# Patient Record
Sex: MALE | Race: WHITE | NOT HISPANIC OR LATINO | URBAN - METROPOLITAN AREA
[De-identification: names, ages, dates, MRNs, and addresses within clinical notes are randomized per-mention and may not be internally consistent; named-entity substitution may affect disease eponyms.]

---

## 2023-03-31 DIAGNOSIS — E11.9 TYPE 2 DIABETES MELLITUS WITHOUT COMPLICATIONS (MULTI): ICD-10-CM

## 2023-03-31 RX ORDER — METFORMIN HYDROCHLORIDE 500 MG/1
1000 TABLET, EXTENDED RELEASE ORAL 2 TIMES DAILY
Qty: 120 TABLET | Refills: 0 | Status: SHIPPED | OUTPATIENT
Start: 2023-03-31 | End: 2023-12-18 | Stop reason: SDUPTHER

## 2023-04-17 DIAGNOSIS — I10 ESSENTIAL (PRIMARY) HYPERTENSION: ICD-10-CM

## 2023-04-17 RX ORDER — LISINOPRIL 20 MG/1
TABLET ORAL
Qty: 90 TABLET | Refills: 1 | OUTPATIENT
Start: 2023-04-17

## 2023-05-02 DIAGNOSIS — E11.9 TYPE 2 DIABETES MELLITUS WITHOUT COMPLICATIONS (MULTI): ICD-10-CM

## 2023-05-02 RX ORDER — METFORMIN HYDROCHLORIDE 500 MG/1
TABLET, EXTENDED RELEASE ORAL
Qty: 120 TABLET | Refills: 0 | OUTPATIENT
Start: 2023-05-02

## 2023-06-08 ENCOUNTER — OFFICE VISIT (OUTPATIENT)
Dept: PRIMARY CARE | Facility: CLINIC | Age: 33
End: 2023-06-08
Payer: COMMERCIAL

## 2023-06-08 VITALS
SYSTOLIC BLOOD PRESSURE: 138 MMHG | HEART RATE: 66 BPM | DIASTOLIC BLOOD PRESSURE: 90 MMHG | WEIGHT: 229 LBS | HEIGHT: 70 IN | BODY MASS INDEX: 32.78 KG/M2

## 2023-06-08 DIAGNOSIS — E79.0 HYPERURICEMIA: ICD-10-CM

## 2023-06-08 DIAGNOSIS — E11.9 TYPE 2 DIABETES MELLITUS WITHOUT COMPLICATION, WITHOUT LONG-TERM CURRENT USE OF INSULIN (MULTI): Primary | ICD-10-CM

## 2023-06-08 DIAGNOSIS — E66.9 CLASS 1 OBESITY WITHOUT SERIOUS COMORBIDITY WITH BODY MASS INDEX (BMI) OF 32.0 TO 32.9 IN ADULT, UNSPECIFIED OBESITY TYPE: ICD-10-CM

## 2023-06-08 DIAGNOSIS — I10 BENIGN ESSENTIAL HYPERTENSION: ICD-10-CM

## 2023-06-08 PROBLEM — F33.0 MILD EPISODE OF RECURRENT MAJOR DEPRESSIVE DISORDER (CMS-HCC): Status: ACTIVE | Noted: 2023-06-08

## 2023-06-08 PROBLEM — E66.811 CLASS 1 OBESITY WITHOUT SERIOUS COMORBIDITY WITH BODY MASS INDEX (BMI) OF 32.0 TO 32.9 IN ADULT: Status: ACTIVE | Noted: 2023-06-08

## 2023-06-08 PROBLEM — E88.810 METABOLIC SYNDROME: Status: ACTIVE | Noted: 2023-06-08

## 2023-06-08 PROCEDURE — 4010F ACE/ARB THERAPY RXD/TAKEN: CPT | Performed by: STUDENT IN AN ORGANIZED HEALTH CARE EDUCATION/TRAINING PROGRAM

## 2023-06-08 PROCEDURE — 3075F SYST BP GE 130 - 139MM HG: CPT | Performed by: STUDENT IN AN ORGANIZED HEALTH CARE EDUCATION/TRAINING PROGRAM

## 2023-06-08 PROCEDURE — 99214 OFFICE O/P EST MOD 30 MIN: CPT | Performed by: STUDENT IN AN ORGANIZED HEALTH CARE EDUCATION/TRAINING PROGRAM

## 2023-06-08 PROCEDURE — 1036F TOBACCO NON-USER: CPT | Performed by: STUDENT IN AN ORGANIZED HEALTH CARE EDUCATION/TRAINING PROGRAM

## 2023-06-08 PROCEDURE — 3008F BODY MASS INDEX DOCD: CPT | Performed by: STUDENT IN AN ORGANIZED HEALTH CARE EDUCATION/TRAINING PROGRAM

## 2023-06-08 PROCEDURE — 3080F DIAST BP >= 90 MM HG: CPT | Performed by: STUDENT IN AN ORGANIZED HEALTH CARE EDUCATION/TRAINING PROGRAM

## 2023-06-08 RX ORDER — LISINOPRIL 20 MG/1
20 TABLET ORAL DAILY
COMMUNITY
End: 2023-06-08

## 2023-06-08 RX ORDER — LISINOPRIL 40 MG/1
40 TABLET ORAL DAILY
Qty: 90 TABLET | Refills: 1 | Status: SHIPPED | OUTPATIENT
Start: 2023-06-08 | End: 2023-12-05

## 2023-06-08 ASSESSMENT — PATIENT HEALTH QUESTIONNAIRE - PHQ9
2. FEELING DOWN, DEPRESSED OR HOPELESS: NOT AT ALL
1. LITTLE INTEREST OR PLEASURE IN DOING THINGS: NOT AT ALL
SUM OF ALL RESPONSES TO PHQ9 QUESTIONS 1 AND 2: 0

## 2023-06-08 NOTE — PROGRESS NOTES
"Kyree Moore is a 33 y.o. year old male presenting for Med Refill       HPI:  Hypertension  Patient is currently on lisinopril 20 mg daily and his blood pressure has been well controlled on this dose in and outside of the office.  His blood pressure is high today in the office.  He can tell a difference if he does double up on his lisinopril, he actually feels better when he takes 2.  Therefore, he is okay with going up on his medication today.  He denies any chest pain, shortness of breath, headaches, weakness, numbness, tingling, lower extremity edema.    Diabetes  Unfortunately, he was oximetry yesterday further unable to tolerate metformin 2000 mg daily, so he is currently on 1000 mg daily.  He is technically overdue for an A1c and he is is willing to go in the next couple of weeks to get that done.  He has not been on any other medication for diabetes but he does know that he would not want to do an injectable.  He has been implementing lifestyle changes through intermittent fasting and watching what he eats.  He already is a pretty active anabela, he works as a labor intensive job.  He is down some weight and he plans on continuing to lose weight.  ROS:   All pertinent positive symptoms are included in history of present illness.    All other systems have been reviewed and are negative and noncontributory to this patient's current ailments.    Current Outpatient Medications   Medication Sig Dispense Refill    lisinopril 20 mg tablet Take 1 tablet (20 mg) by mouth once daily.      metFORMIN XR (Glucophage-XR) 500 mg 24 hr tablet Take 2 tablets (1,000 mg) by mouth in the morning and 2 tablets (1,000 mg) before bedtime. 120 tablet 0     No current facility-administered medications for this visit.       OBJECTIVE  Visit Vitals  /90   Pulse 66   Ht 1.778 m (5' 10\")   Wt 104 kg (229 lb)   BMI 32.86 kg/m²   Smoking Status Never   BSA 2.27 m²        Physical Exam:  GENERAL: Alert, oriented, pleasant, in no acute " distress  HEENT: Head normocephalic, atraumatic  CV: Heart with regular rate and rhythm, normal S1/S2, no murmurs; normal peripheral pulses- radial and dorsalis pedis palpable  LUNGS: CTAB without wheezing, rhonchi or rales; good respiratory effort, no increased work of breathing  ABDOMEN: soft, non-tender, non-distended, no masses appreciated; +BS in all four quadrants  EXTREMITIES: no edema, no cyanosis  PSYCH: Appropriate mood and affect  SKIN: No rashes or lesions appreciated    Assessment/Plan   Diagnoses and all orders for this visit:  Type 2 diabetes mellitus without complication, without long-term current use of insulin (CMS/[facility])  Continue metformin 1000 mg daily.  He is not tolerating higher doses of the metformin secondary to diarrhea.  I do not want to send another medication until I have an updated A1c, so labs were ordered today.  He will go in the next couple of weeks to get this done  -     Albumin , Urine Random; Future  -     Comprehensive Metabolic Panel; Future  -     Hemoglobin A1C; Future  -     Lipid Panel; Future  Benign essential hypertension  Currently uncontrolled on lisinopril 20 mg.  Increase lisinopril to 40 mg daily.  I asked that he check his blood pressure daily and keep a log and send this log to me to make sure his blood pressure is now well controlled.  -     Lipid Panel; Future  Hyperuricemia  He has not had any further gout flares, but will check uric acid level to see where he is at now  -     Uric acid; Future  Class 1 obesity without serious comorbidity with body mass index (BMI) of 32.0 to 32.9 in adult, unspecified obesity type  Continue the lifestyle changes implemented as that they have been effective at helping with weight loss    Based on his A1c, he will follow-up in 3 to 6 months

## 2023-06-23 ENCOUNTER — LAB (OUTPATIENT)
Dept: LAB | Facility: LAB | Age: 33
End: 2023-06-23
Payer: COMMERCIAL

## 2023-06-23 DIAGNOSIS — E11.9 TYPE 2 DIABETES MELLITUS WITHOUT COMPLICATION, WITHOUT LONG-TERM CURRENT USE OF INSULIN (MULTI): ICD-10-CM

## 2023-06-23 DIAGNOSIS — I10 BENIGN ESSENTIAL HYPERTENSION: ICD-10-CM

## 2023-06-23 DIAGNOSIS — E79.0 HYPERURICEMIA: ICD-10-CM

## 2023-06-23 LAB
ALANINE AMINOTRANSFERASE (SGPT) (U/L) IN SER/PLAS: 19 U/L (ref 10–52)
ALBUMIN (G/DL) IN SER/PLAS: 4.4 G/DL (ref 3.4–5)
ALBUMIN (MG/L) IN URINE: <7 MG/L
ALBUMIN/CREATININE (UG/MG) IN URINE: NORMAL UG/MG CRT (ref 0–30)
ALKALINE PHOSPHATASE (U/L) IN SER/PLAS: 61 U/L (ref 33–120)
ANION GAP IN SER/PLAS: 11 MMOL/L (ref 10–20)
ASPARTATE AMINOTRANSFERASE (SGOT) (U/L) IN SER/PLAS: 14 U/L (ref 9–39)
BILIRUBIN TOTAL (MG/DL) IN SER/PLAS: 0.7 MG/DL (ref 0–1.2)
CALCIUM (MG/DL) IN SER/PLAS: 9.2 MG/DL (ref 8.6–10.6)
CARBON DIOXIDE, TOTAL (MMOL/L) IN SER/PLAS: 29 MMOL/L (ref 21–32)
CHLORIDE (MMOL/L) IN SER/PLAS: 101 MMOL/L (ref 98–107)
CHOLESTEROL (MG/DL) IN SER/PLAS: 166 MG/DL (ref 0–199)
CHOLESTEROL IN HDL (MG/DL) IN SER/PLAS: 31.6 MG/DL
CHOLESTEROL/HDL RATIO: 5.3
CREATININE (MG/DL) IN SER/PLAS: 0.94 MG/DL (ref 0.5–1.3)
CREATININE (MG/DL) IN URINE: 61.5 MG/DL (ref 20–370)
ESTIMATED AVERAGE GLUCOSE FOR HBA1C: 137 MG/DL
GFR MALE: >90 ML/MIN/1.73M2
GLUCOSE (MG/DL) IN SER/PLAS: 113 MG/DL (ref 74–99)
HEMOGLOBIN A1C/HEMOGLOBIN TOTAL IN BLOOD: 6.4 %
LDL: 102 MG/DL (ref 0–99)
POTASSIUM (MMOL/L) IN SER/PLAS: 4.9 MMOL/L (ref 3.5–5.3)
PROTEIN TOTAL: 7.3 G/DL (ref 6.4–8.2)
SODIUM (MMOL/L) IN SER/PLAS: 136 MMOL/L (ref 136–145)
TRIGLYCERIDE (MG/DL) IN SER/PLAS: 161 MG/DL (ref 0–149)
URATE (MG/DL) IN SER/PLAS: 8.9 MG/DL (ref 4–7.5)
UREA NITROGEN (MG/DL) IN SER/PLAS: 14 MG/DL (ref 6–23)
VLDL: 32 MG/DL (ref 0–40)

## 2023-06-23 PROCEDURE — 36415 COLL VENOUS BLD VENIPUNCTURE: CPT

## 2023-06-23 PROCEDURE — 80053 COMPREHEN METABOLIC PANEL: CPT

## 2023-06-23 PROCEDURE — 80061 LIPID PANEL: CPT

## 2023-06-23 PROCEDURE — 84550 ASSAY OF BLOOD/URIC ACID: CPT

## 2023-06-23 PROCEDURE — 82043 UR ALBUMIN QUANTITATIVE: CPT

## 2023-06-23 PROCEDURE — 82570 ASSAY OF URINE CREATININE: CPT

## 2023-06-23 PROCEDURE — 83036 HEMOGLOBIN GLYCOSYLATED A1C: CPT

## 2023-06-26 DIAGNOSIS — E11.9 TYPE 2 DIABETES MELLITUS WITHOUT COMPLICATION, WITHOUT LONG-TERM CURRENT USE OF INSULIN (MULTI): Primary | ICD-10-CM

## 2023-10-03 ENCOUNTER — OFFICE VISIT (OUTPATIENT)
Dept: PRIMARY CARE | Facility: CLINIC | Age: 33
End: 2023-10-03
Payer: COMMERCIAL

## 2023-10-03 VITALS
WEIGHT: 236 LBS | DIASTOLIC BLOOD PRESSURE: 84 MMHG | SYSTOLIC BLOOD PRESSURE: 126 MMHG | HEART RATE: 116 BPM | BODY MASS INDEX: 33.79 KG/M2 | HEIGHT: 70 IN

## 2023-10-03 DIAGNOSIS — R11.0 NAUSEA: ICD-10-CM

## 2023-10-03 DIAGNOSIS — K43.9 HERNIA OF ANTERIOR ABDOMINAL WALL: Primary | ICD-10-CM

## 2023-10-03 DIAGNOSIS — K21.9 GASTROESOPHAGEAL REFLUX DISEASE, UNSPECIFIED WHETHER ESOPHAGITIS PRESENT: ICD-10-CM

## 2023-10-03 DIAGNOSIS — E11.9 TYPE 2 DIABETES MELLITUS WITHOUT COMPLICATION, WITHOUT LONG-TERM CURRENT USE OF INSULIN (MULTI): ICD-10-CM

## 2023-10-03 PROCEDURE — 3044F HG A1C LEVEL LT 7.0%: CPT | Performed by: STUDENT IN AN ORGANIZED HEALTH CARE EDUCATION/TRAINING PROGRAM

## 2023-10-03 PROCEDURE — 99214 OFFICE O/P EST MOD 30 MIN: CPT | Performed by: STUDENT IN AN ORGANIZED HEALTH CARE EDUCATION/TRAINING PROGRAM

## 2023-10-03 PROCEDURE — 3008F BODY MASS INDEX DOCD: CPT | Performed by: STUDENT IN AN ORGANIZED HEALTH CARE EDUCATION/TRAINING PROGRAM

## 2023-10-03 PROCEDURE — 4010F ACE/ARB THERAPY RXD/TAKEN: CPT | Performed by: STUDENT IN AN ORGANIZED HEALTH CARE EDUCATION/TRAINING PROGRAM

## 2023-10-03 PROCEDURE — 3074F SYST BP LT 130 MM HG: CPT | Performed by: STUDENT IN AN ORGANIZED HEALTH CARE EDUCATION/TRAINING PROGRAM

## 2023-10-03 PROCEDURE — 1036F TOBACCO NON-USER: CPT | Performed by: STUDENT IN AN ORGANIZED HEALTH CARE EDUCATION/TRAINING PROGRAM

## 2023-10-03 PROCEDURE — 3079F DIAST BP 80-89 MM HG: CPT | Performed by: STUDENT IN AN ORGANIZED HEALTH CARE EDUCATION/TRAINING PROGRAM

## 2023-10-03 RX ORDER — ONDANSETRON 4 MG/1
4 TABLET, FILM COATED ORAL EVERY 8 HOURS PRN
Qty: 20 TABLET | Refills: 0 | Status: SHIPPED | OUTPATIENT
Start: 2023-10-03 | End: 2023-10-10

## 2023-10-03 RX ORDER — OMEPRAZOLE 20 MG/1
20 CAPSULE, DELAYED RELEASE ORAL DAILY
Qty: 30 CAPSULE | Refills: 0 | Status: SHIPPED | OUTPATIENT
Start: 2023-10-03 | End: 2023-10-25

## 2023-10-03 NOTE — PROGRESS NOTES
Subjective   Patient ID: Kyree Moore is a 33 y.o. male who presents for Hernia.    HPI  1.  Hernia  Reports lifting heavy pans nearly 80 pounds each about a month ago and recalls having slight abdominal discomfort at the time which was brief in duration  He is concerned because he has noticed a small protrusion of his abdomen to the right of his umbilicus ever since that time  States that this has not been painful ever or changed in size but is seeking evaluation today to see if this may be a hernia    2.  Nausea/GERD  Reports history of nausea which began just over this past weekend  Denies excessive consumption of food or alcohol  Denies fever, abdominal pain or vomiting  Admits to history of GERD in the past for which he was taking omeprazole but is not currently  Denies current acid reflux symptoms    3.  T2DM  This was addressed during his visit with us in June and is stable    Review of Systems  All pertinent positive symptoms are included in the history of present illness.    All other systems have been reviewed and are negative and noncontributory to this patient's current ailments.    Current Outpatient Medications   Medication Instructions    lisinopril 40 mg, oral, Daily    metFORMIN XR (GLUCOPHAGE-XR) 1,000 mg, oral, 2 times daily    omeprazole (PRILOSEC) 20 mg, oral, Daily, Do not crush or chew.    ondansetron (ZOFRAN) 4 mg, oral, Every 8 hours PRN     No Known Allergies      There is no immunization history on file for this patient.  Past Surgical History:   Procedure Laterality Date    OTHER SURGICAL HISTORY  10/08/2020    No history of surgery    WISDOM TOOTH EXTRACTION       Family History   Problem Relation Name Age of Onset    Aortic aneurysm Mother      Hypertension Mother      Other (bladder cancer) Father      Kidney failure Father      Hypertension Father      Prostate cancer Paternal Grandfather       Social History     Tobacco Use    Smoking status: Never    Smokeless tobacco: Never  "      Objective   Visit Vitals  /84   Pulse (!) 116   Ht 1.778 m (5' 10\")   Wt 107 kg (236 lb)   BMI 33.86 kg/m²   Smoking Status Never   BSA 2.3 m²       Physical Exam  CONSTITUTIONAL - well nourished, well developed, looks like stated age, in no acute distress, not ill-appearing, and not tired appearing  SKIN - normal skin color and pigmentation, normal skin turgor without rash, lesions, or nodules visualized  HEAD - no trauma, normocephalic  CHEST - clear to auscultation, no wheezing, no crackles and no rales, good effort  CARDIAC - regular rate and regular rhythm, no skipped beats, no murmur  ABDOMEN - soft, nontender, nondistended, no palpable masses or hernias appreciated even during leg lift  EXTREMITIES - no edema, no deformities  NEUROLOGICAL - normal gait, normal balance, normal motor  PSYCHIATRIC - alert, pleasant and cordial, age-appropriate    Assessment/Plan   Problem List Items Addressed This Visit          Endocrine/Metabolic    Type 2 diabetes mellitus (CMS/HCC)     Stable, no changes            Gastrointestinal and Abdominal    Nausea     I suspect your nausea may be from acid reflux  As previously mentioned, we will treat with omeprazole for at least 2 weeks  Should your nausea persist, I have also sent a prescription for Zofran to take as needed         Relevant Medications    ondansetron (Zofran) 4 mg tablet    Gastroesophageal reflux disease    Relevant Medications    omeprazole (PriLOSEC) 20 mg DR capsule    Hernia of anterior abdominal wall - Primary     There was no evidence of an abdominal hernia on your exam today  You were also unable to find this protuberance mentioned when I asked you to  It is possible you may have developed a small hernia while lifting the heavy pans last month but there is no definitive evidence based on my findings  I explained the importance of continued surveillance of any new or worsening abdominal pain as this could be a sign of bowel strangulation which " is a surgical emergency  You expressed full understanding of this and agreed to proceed to the nearest emergency department should these symptoms occur          Current Outpatient Medications   Medication Instructions    lisinopril 40 mg, oral, Daily    metFORMIN XR (GLUCOPHAGE-XR) 1,000 mg, oral, 2 times daily    omeprazole (PRILOSEC) 20 mg, oral, Daily, Do not crush or chew.    ondansetron (ZOFRAN) 4 mg, oral, Every 8 hours PRN

## 2023-10-04 NOTE — ASSESSMENT & PLAN NOTE
I suspect your nausea may be from acid reflux  As previously mentioned, we will treat with omeprazole for at least 2 weeks  Should your nausea persist, I have also sent a prescription for Zofran to take as needed

## 2023-10-04 NOTE — ASSESSMENT & PLAN NOTE
There was no evidence of an abdominal hernia on your exam today  You were also unable to find this protuberance mentioned when I asked you to  It is possible you may have developed a small hernia while lifting the heavy pans last month but there is no definitive evidence based on my findings  I explained the importance of continued surveillance of any new or worsening abdominal pain as this could be a sign of bowel strangulation which is a surgical emergency  You expressed full understanding of this and agreed to proceed to the nearest emergency department should these symptoms occur

## 2023-10-16 ENCOUNTER — OFFICE VISIT (OUTPATIENT)
Dept: PRIMARY CARE | Facility: CLINIC | Age: 33
End: 2023-10-16
Payer: COMMERCIAL

## 2023-10-16 VITALS
WEIGHT: 238 LBS | BODY MASS INDEX: 34.07 KG/M2 | DIASTOLIC BLOOD PRESSURE: 82 MMHG | HEART RATE: 124 BPM | SYSTOLIC BLOOD PRESSURE: 122 MMHG | HEIGHT: 70 IN

## 2023-10-16 DIAGNOSIS — E79.0 HYPERURICEMIA: ICD-10-CM

## 2023-10-16 DIAGNOSIS — M10.072 ACUTE IDIOPATHIC GOUT INVOLVING TOE OF LEFT FOOT: Primary | ICD-10-CM

## 2023-10-16 DIAGNOSIS — R22.2 ABDOMINAL WALL LUMP: ICD-10-CM

## 2023-10-16 PROCEDURE — 99214 OFFICE O/P EST MOD 30 MIN: CPT | Performed by: STUDENT IN AN ORGANIZED HEALTH CARE EDUCATION/TRAINING PROGRAM

## 2023-10-16 PROCEDURE — 1036F TOBACCO NON-USER: CPT | Performed by: STUDENT IN AN ORGANIZED HEALTH CARE EDUCATION/TRAINING PROGRAM

## 2023-10-16 PROCEDURE — 3079F DIAST BP 80-89 MM HG: CPT | Performed by: STUDENT IN AN ORGANIZED HEALTH CARE EDUCATION/TRAINING PROGRAM

## 2023-10-16 PROCEDURE — 4010F ACE/ARB THERAPY RXD/TAKEN: CPT | Performed by: STUDENT IN AN ORGANIZED HEALTH CARE EDUCATION/TRAINING PROGRAM

## 2023-10-16 PROCEDURE — 3044F HG A1C LEVEL LT 7.0%: CPT | Performed by: STUDENT IN AN ORGANIZED HEALTH CARE EDUCATION/TRAINING PROGRAM

## 2023-10-16 PROCEDURE — 3008F BODY MASS INDEX DOCD: CPT | Performed by: STUDENT IN AN ORGANIZED HEALTH CARE EDUCATION/TRAINING PROGRAM

## 2023-10-16 PROCEDURE — 3074F SYST BP LT 130 MM HG: CPT | Performed by: STUDENT IN AN ORGANIZED HEALTH CARE EDUCATION/TRAINING PROGRAM

## 2023-10-16 RX ORDER — COLCHICINE 0.6 MG/1
0.6 TABLET ORAL DAILY
Qty: 90 TABLET | Refills: 0 | Status: SHIPPED | OUTPATIENT
Start: 2023-10-16 | End: 2024-01-14

## 2023-10-16 RX ORDER — ALLOPURINOL 100 MG/1
100 TABLET ORAL DAILY
Qty: 30 TABLET | Refills: 1 | Status: SHIPPED | OUTPATIENT
Start: 2023-10-16 | End: 2023-11-07

## 2023-10-16 RX ORDER — PREDNISONE 20 MG/1
60 TABLET ORAL DAILY
Qty: 15 TABLET | Refills: 0 | Status: SHIPPED | OUTPATIENT
Start: 2023-10-16 | End: 2023-10-21

## 2023-10-16 NOTE — PROGRESS NOTES
"Kyree Moore is a 33 y.o. year old male presenting for Gout       HPI:  Gout  Patient woke up yesterday with extreme left big toe pain actually on the bottom of the toe and the inability to walk on that side of his foot due to pretty severe pain.  He does have a history of gout, but he has always had it in his ankles not in his toes.  He has had hyperuricemia on his blood work, but has not been on any uric acid lowering medication to this point.  He did have some leftover colchicine at home, so he actually started taking it and it has not been helpful.  He has also been taking 600 mg of ibuprofen a few times and that has not been helpful either.  He cannot recall any trauma to the toe/foot, so he does believe that this is another gout flare.    Abdominal wall lump  He states that this lump to the right of his bellybutton is still present and its really concerning to him at this point that it is still there.  It is not painful or bothering him, but it is just bothering him that there is a lump/bump there when there should not be.  He is wondering what we can do at this point to investigate that to see what it is.  ROS:   All pertinent positive symptoms are included in history of present illness.    All other systems have been reviewed and are negative and noncontributory to this patient's current ailments.    Current Outpatient Medications   Medication Sig Dispense Refill    lisinopril 40 mg tablet Take 1 tablet (40 mg) by mouth once daily. 90 tablet 1    metFORMIN XR (Glucophage-XR) 500 mg 24 hr tablet Take 2 tablets (1,000 mg) by mouth in the morning and 2 tablets (1,000 mg) before bedtime. 120 tablet 0    omeprazole (PriLOSEC) 20 mg DR capsule Take 1 capsule (20 mg) by mouth once daily. Do not crush or chew. 30 capsule 0     No current facility-administered medications for this visit.       OBJECTIVE  Visit Vitals  /82   Pulse (!) 124   Ht 1.778 m (5' 10\")   Wt 108 kg (238 lb)   BMI 34.15 kg/m²   Smoking " Status Never   BSA 2.31 m²        Physical Exam:  GENERAL: Alert, oriented, pleasant, in no acute distress  HEENT: Head normocephalic, atraumatic  EXTREMITIES: no edema, no cyanosis; tenderness on the plantar side of the first MTP joint without redness  PSYCH: Appropriate mood and affect  SKIN: No rashes or lesions appreciated    Assessment/Plan   Diagnoses and all orders for this visit:  Acute idiopathic gout involving toe of left foot  -     predniSONE (Deltasone) 20 mg tablet; Take 3 tablets (60 mg) by mouth once daily for 5 days.  Hyperuricemia  -     allopurinol (Zyloprim) 100 mg tablet; Take 1 tablet (100 mg) by mouth once daily.  -     colchicine, gout, 0.6 mg tablet; Take 1 tablet (0.6 mg) by mouth once daily.  -     Uric acid; Future  I instructed him to start the colchicine after he is done with the prednisone and he can start the allopurinol right away.  He is to continue the colchicine once daily for the next 8 weeks or so after starting allopurinol.  Uric acid level to be checked in 1 month  Abdominal wall lump  -     US abdomen limited; Future    Follow-up as previously scheduled

## 2023-10-25 ENCOUNTER — ANCILLARY PROCEDURE (OUTPATIENT)
Dept: RADIOLOGY | Facility: CLINIC | Age: 33
End: 2023-10-25
Payer: COMMERCIAL

## 2023-10-25 DIAGNOSIS — R22.2 ABDOMINAL WALL LUMP: ICD-10-CM

## 2023-10-25 PROCEDURE — 76705 ECHO EXAM OF ABDOMEN: CPT | Performed by: RADIOLOGY

## 2023-10-25 PROCEDURE — 76705 ECHO EXAM OF ABDOMEN: CPT

## 2023-10-27 ENCOUNTER — APPOINTMENT (OUTPATIENT)
Dept: RADIOLOGY | Facility: CLINIC | Age: 33
End: 2023-10-27
Payer: COMMERCIAL

## 2023-11-07 DIAGNOSIS — E79.0 HYPERURICEMIA: ICD-10-CM

## 2023-11-07 RX ORDER — ALLOPURINOL 100 MG/1
100 TABLET ORAL DAILY
Qty: 90 TABLET | Refills: 1 | Status: SHIPPED | OUTPATIENT
Start: 2023-11-07 | End: 2024-05-06

## 2023-11-17 ENCOUNTER — LAB (OUTPATIENT)
Dept: LAB | Facility: LAB | Age: 33
End: 2023-11-17
Payer: COMMERCIAL

## 2023-11-17 DIAGNOSIS — E79.0 HYPERURICEMIA: ICD-10-CM

## 2023-11-17 DIAGNOSIS — E11.9 TYPE 2 DIABETES MELLITUS WITHOUT COMPLICATION, WITHOUT LONG-TERM CURRENT USE OF INSULIN (MULTI): ICD-10-CM

## 2023-11-17 LAB
EST. AVERAGE GLUCOSE BLD GHB EST-MCNC: 174 MG/DL
HBA1C MFR BLD: 7.7 %
URATE SERPL-MCNC: 6.6 MG/DL (ref 4–7.5)

## 2023-11-17 PROCEDURE — 83036 HEMOGLOBIN GLYCOSYLATED A1C: CPT

## 2023-11-17 PROCEDURE — 84550 ASSAY OF BLOOD/URIC ACID: CPT

## 2023-11-17 PROCEDURE — 36415 COLL VENOUS BLD VENIPUNCTURE: CPT

## 2023-12-05 DIAGNOSIS — I10 BENIGN ESSENTIAL HYPERTENSION: ICD-10-CM

## 2023-12-05 RX ORDER — LISINOPRIL 40 MG/1
40 TABLET ORAL DAILY
Qty: 90 TABLET | Refills: 1 | Status: SHIPPED | OUTPATIENT
Start: 2023-12-05

## 2023-12-18 DIAGNOSIS — E11.9 TYPE 2 DIABETES MELLITUS WITHOUT COMPLICATIONS (MULTI): Primary | ICD-10-CM

## 2023-12-18 RX ORDER — METFORMIN HYDROCHLORIDE 500 MG/1
1000 TABLET, EXTENDED RELEASE ORAL 2 TIMES DAILY
Qty: 120 TABLET | Refills: 0 | Status: SHIPPED | OUTPATIENT
Start: 2023-12-18 | End: 2024-01-15

## 2024-01-13 DIAGNOSIS — E11.9 TYPE 2 DIABETES MELLITUS WITHOUT COMPLICATIONS (MULTI): ICD-10-CM

## 2024-01-15 RX ORDER — METFORMIN HYDROCHLORIDE 500 MG/1
1000 TABLET, EXTENDED RELEASE ORAL 2 TIMES DAILY
Qty: 360 TABLET | Refills: 1 | Status: SHIPPED | OUTPATIENT
Start: 2024-01-15

## 2024-05-06 DIAGNOSIS — E79.0 HYPERURICEMIA: ICD-10-CM

## 2024-05-06 RX ORDER — ALLOPURINOL 100 MG/1
100 TABLET ORAL DAILY
Qty: 90 TABLET | Refills: 1 | Status: SHIPPED | OUTPATIENT
Start: 2024-05-06

## 2024-06-17 DIAGNOSIS — I10 BENIGN ESSENTIAL HYPERTENSION: ICD-10-CM

## 2024-06-18 RX ORDER — LISINOPRIL 40 MG/1
40 TABLET ORAL DAILY
Qty: 90 TABLET | Refills: 1 | OUTPATIENT
Start: 2024-06-18

## 2024-06-21 DIAGNOSIS — I10 BENIGN ESSENTIAL HYPERTENSION: Primary | ICD-10-CM

## 2024-06-21 RX ORDER — LISINOPRIL 40 MG/1
40 TABLET ORAL DAILY
Qty: 90 TABLET | Refills: 1 | Status: SHIPPED | OUTPATIENT
Start: 2024-06-21

## 2024-06-27 ENCOUNTER — APPOINTMENT (OUTPATIENT)
Dept: PRIMARY CARE | Facility: CLINIC | Age: 34
End: 2024-06-27
Payer: COMMERCIAL

## 2024-06-27 VITALS
BODY MASS INDEX: 31.35 KG/M2 | SYSTOLIC BLOOD PRESSURE: 130 MMHG | DIASTOLIC BLOOD PRESSURE: 84 MMHG | HEIGHT: 70 IN | WEIGHT: 219 LBS | HEART RATE: 64 BPM

## 2024-06-27 DIAGNOSIS — Z00.00 ENCOUNTER FOR PREVENTIVE HEALTH EXAMINATION: Primary | ICD-10-CM

## 2024-06-27 DIAGNOSIS — E79.0 HYPERURICEMIA: ICD-10-CM

## 2024-06-27 DIAGNOSIS — E11.9 TYPE 2 DIABETES MELLITUS WITHOUT COMPLICATION, WITHOUT LONG-TERM CURRENT USE OF INSULIN (MULTI): ICD-10-CM

## 2024-06-27 DIAGNOSIS — Z11.59 ENCOUNTER FOR HEPATITIS C SCREENING TEST FOR LOW RISK PATIENT: ICD-10-CM

## 2024-06-27 DIAGNOSIS — I10 BENIGN ESSENTIAL HYPERTENSION: ICD-10-CM

## 2024-06-27 PROBLEM — K21.9 GASTROESOPHAGEAL REFLUX DISEASE: Status: RESOLVED | Noted: 2023-10-03 | Resolved: 2024-06-27

## 2024-06-27 PROCEDURE — 3079F DIAST BP 80-89 MM HG: CPT | Performed by: STUDENT IN AN ORGANIZED HEALTH CARE EDUCATION/TRAINING PROGRAM

## 2024-06-27 PROCEDURE — 99395 PREV VISIT EST AGE 18-39: CPT | Performed by: STUDENT IN AN ORGANIZED HEALTH CARE EDUCATION/TRAINING PROGRAM

## 2024-06-27 PROCEDURE — 3075F SYST BP GE 130 - 139MM HG: CPT | Performed by: STUDENT IN AN ORGANIZED HEALTH CARE EDUCATION/TRAINING PROGRAM

## 2024-06-27 PROCEDURE — 4010F ACE/ARB THERAPY RXD/TAKEN: CPT | Performed by: STUDENT IN AN ORGANIZED HEALTH CARE EDUCATION/TRAINING PROGRAM

## 2024-06-27 PROCEDURE — 99214 OFFICE O/P EST MOD 30 MIN: CPT | Performed by: STUDENT IN AN ORGANIZED HEALTH CARE EDUCATION/TRAINING PROGRAM

## 2024-06-27 PROCEDURE — 1036F TOBACCO NON-USER: CPT | Performed by: STUDENT IN AN ORGANIZED HEALTH CARE EDUCATION/TRAINING PROGRAM

## 2024-06-27 RX ORDER — LISINOPRIL 40 MG/1
40 TABLET ORAL DAILY
Qty: 90 TABLET | Refills: 1 | Status: SHIPPED | OUTPATIENT
Start: 2024-06-27

## 2024-06-27 RX ORDER — ALLOPURINOL 100 MG/1
100 TABLET ORAL DAILY
Qty: 90 TABLET | Refills: 1 | Status: SHIPPED | OUTPATIENT
Start: 2024-06-27

## 2024-06-27 NOTE — PROGRESS NOTES
Subjective   Patient ID: Kyree Moore is a 34 y.o. male who presents for Med Refill.    HPI  Diet is well balanced.  Exercises regularly.  Vaccines are up to date.  Dental exam is up to date.  Vision exam is not up to date.  Patient is not fasting for labs today.   Social: Tobacco use- nonsmoker      Alcohol use- very rare    Other concerns addressed today include:   Type 2 diabetes  He is currently on metformin XR 1000 mg twice daily and doing well on this medication without side effects.  He has recently had about 20 pound intentional weight loss through lifestyle changes and has been feeling really good.  He has been changing the types of sugars he is taking and by finding alternatives to the sour candies that he is pretty addicted to.  He has not had any high or low glucose symptoms.  He has not had an eye exam this year and he is agreeable to going getting that done.    Hypertension  He currently takes lisinopril 40 mg daily for blood pressure control.  Has been doing well on this medication without side effects.  Denies any chest pain, shortness of breath, headaches, weakness, numbness, tingling, lower extremity edema.    Hyperuricemia  He takes allopurinol 100 mg daily for elevated uric acid.  He has not had any issues with kidney stones or gout since his last visit and feels like it must be working at this point.  He is drinking plenty of water throughout the day.    Review of Systems  All pertinent positive symptoms are included in the history of present illness.    All other systems have been reviewed and are negative and noncontributory to this patient's current ailments.     Social History     Tobacco Use    Smoking status: Never    Smokeless tobacco: Never   Substance Use Topics    Alcohol use: Not on file      Past Surgical History:   Procedure Laterality Date    OTHER SURGICAL HISTORY  10/08/2020    No history of surgery    WISDOM TOOTH EXTRACTION        No Known Allergies     Current Outpatient  "Medications   Medication Sig Dispense Refill    allopurinol (Zyloprim) 100 mg tablet TAKE 1 TABLET BY MOUTH EVERY DAY 90 tablet 1    lisinopril 40 mg tablet Take 1 tablet (40 mg) by mouth once daily. 90 tablet 1    metFORMIN  mg 24 hr tablet TAKE 2 TABLETS (1000 MG) BY MOUTH TWICE A  tablet 1    omeprazole (PriLOSEC) 20 mg DR capsule TAKE 1 CAPSULE (20 MG) BY MOUTH ONCE DAILY DO NOT CRUSH OR CHEW 90 capsule 1     No current facility-administered medications for this visit.        Patient Active Problem List   Diagnosis    Benign essential hypertension    Hyperuricemia    Metabolic syndrome    Mild episode of recurrent major depressive disorder (CMS-[facility])    Type 2 diabetes mellitus (Multi)    Class 1 obesity without serious comorbidity with body mass index (BMI) of 32.0 to 32.9 in adult    Nausea    Gastroesophageal reflux disease    Hernia of anterior abdominal wall        There is no immunization history on file for this patient.    Objective   VITALS  /84   Pulse 64   Ht 1.778 m (5' 10\")   Wt 99.3 kg (219 lb)   BMI 31.42 kg/m²      Physical Exam  CONSTITUTIONAL - well nourished, well developed, looks like stated age, in no acute distress, not ill-appearing, and not tired appearing  SKIN - normal skin color and pigmentation, normal skin turgor without rash, lesions, or nodules visualized  HEAD - no trauma, normocephalic  EYES - pupils are equal and reactive to light, extraocular muscles are intact, and normal external exam  ENT - TM's intact, no injection, no signs of infection, uvula midline, normal tongue movement and throat normal, no exudate, nasal passage without discharge and patent  NECK - supple without rigidity, no neck mass was observed, no thyromegaly or thyroid nodules  CHEST - clear to auscultation, no wheezing, no crackles and no rales, good effort  CARDIAC - regular rate and regular rhythm, no skipped beats, no murmur  ABDOMEN - no organomegaly, soft, nontender, nondistended, " normal bowel sounds, no guarding/rebound/rigidity, negative McBurney sign and negative Iglesias sign  EXTREMITIES - no edema, no deformities  NEUROLOGICAL - normal gait, normal balance, normal motor, no ataxia, DTRs equal and symmetrical; alert, oriented and no focal signs  PSYCHIATRIC - alert, pleasant and cordial, age-appropriate  IMMUNOLOGIC - no cervical lymphadenopathy     Assessment/Plan   Diagnoses and all orders for this visit:  Encounter for preventive health examination  A complete history and physical was performed today.  Vaccines are up to date.  Fasting labs ordered today include:  -     CBC; Future  -     Comprehensive Metabolic Panel; Future  -     Hemoglobin A1C; Future  -     Lipid Panel; Future  -     TSH with reflex to Free T4 if abnormal; Future  Encounter for hepatitis C screening test for low risk patient  -     Hepatitis C Antibody; Future  Type 2 diabetes mellitus without complication, without long-term current use of insulin (Multi)  Due for A1c.  I will send his metformin once this results.  With the dietary changes, increased exercise and weight loss, I am hopeful that his A1c came down to goal.  Will also update his urine albumin as well  -     Albumin-Creatinine Ratio, Urine Random; Future  Benign essential hypertension  Well-controlled on current medication, refills provided  -     lisinopril 40 mg tablet; Take 1 tablet (40 mg) by mouth once daily.  Hyperuricemia  He is due for uric acid check at this time, last 1 was normal on allopurinol 100 mg daily.  I expect it will remain normal, so refill was sent today  -     Uric acid; Future  -     allopurinol (Zyloprim) 100 mg tablet; Take 1 tablet (100 mg) by mouth once daily.    He will follow-up in about 5 months

## 2024-07-13 ENCOUNTER — LAB (OUTPATIENT)
Dept: LAB | Facility: LAB | Age: 34
End: 2024-07-13
Payer: COMMERCIAL

## 2024-07-13 DIAGNOSIS — E11.9 TYPE 2 DIABETES MELLITUS WITHOUT COMPLICATION, WITHOUT LONG-TERM CURRENT USE OF INSULIN (MULTI): ICD-10-CM

## 2024-07-13 DIAGNOSIS — E79.0 HYPERURICEMIA: ICD-10-CM

## 2024-07-13 DIAGNOSIS — Z00.00 ENCOUNTER FOR PREVENTIVE HEALTH EXAMINATION: ICD-10-CM

## 2024-07-13 DIAGNOSIS — Z11.59 ENCOUNTER FOR HEPATITIS C SCREENING TEST FOR LOW RISK PATIENT: ICD-10-CM

## 2024-07-13 LAB
ALBUMIN SERPL BCP-MCNC: 4.8 G/DL (ref 3.4–5)
ALP SERPL-CCNC: 52 U/L (ref 33–120)
ALT SERPL W P-5'-P-CCNC: 18 U/L (ref 10–52)
ANION GAP SERPL CALC-SCNC: 14 MMOL/L (ref 10–20)
AST SERPL W P-5'-P-CCNC: 15 U/L (ref 9–39)
BILIRUB SERPL-MCNC: 0.7 MG/DL (ref 0–1.2)
BUN SERPL-MCNC: 17 MG/DL (ref 6–23)
CALCIUM SERPL-MCNC: 10 MG/DL (ref 8.6–10.6)
CHLORIDE SERPL-SCNC: 100 MMOL/L (ref 98–107)
CHOLEST SERPL-MCNC: 184 MG/DL (ref 0–199)
CHOLESTEROL/HDL RATIO: 4.8
CO2 SERPL-SCNC: 29 MMOL/L (ref 21–32)
CREAT SERPL-MCNC: 0.9 MG/DL (ref 0.5–1.3)
EGFRCR SERPLBLD CKD-EPI 2021: >90 ML/MIN/1.73M*2
ERYTHROCYTE [DISTWIDTH] IN BLOOD BY AUTOMATED COUNT: 13.2 % (ref 11.5–14.5)
EST. AVERAGE GLUCOSE BLD GHB EST-MCNC: 117 MG/DL
GLUCOSE SERPL-MCNC: 99 MG/DL (ref 74–99)
HBA1C MFR BLD: 5.7 %
HCT VFR BLD AUTO: 45.4 % (ref 41–52)
HCV AB SER QL: NONREACTIVE
HDLC SERPL-MCNC: 38 MG/DL
HGB BLD-MCNC: 14.3 G/DL (ref 13.5–17.5)
LDLC SERPL CALC-MCNC: 119 MG/DL
MCH RBC QN AUTO: 26.8 PG (ref 26–34)
MCHC RBC AUTO-ENTMCNC: 31.5 G/DL (ref 32–36)
MCV RBC AUTO: 85 FL (ref 80–100)
NON HDL CHOLESTEROL: 146 MG/DL (ref 0–149)
NRBC BLD-RTO: 0 /100 WBCS (ref 0–0)
PLATELET # BLD AUTO: 263 X10*3/UL (ref 150–450)
POTASSIUM SERPL-SCNC: 5.5 MMOL/L (ref 3.5–5.3)
PROT SERPL-MCNC: 8 G/DL (ref 6.4–8.2)
RBC # BLD AUTO: 5.33 X10*6/UL (ref 4.5–5.9)
SODIUM SERPL-SCNC: 137 MMOL/L (ref 136–145)
TRIGL SERPL-MCNC: 137 MG/DL (ref 0–149)
TSH SERPL-ACNC: 2.06 MIU/L (ref 0.44–3.98)
URATE SERPL-MCNC: 7.8 MG/DL (ref 4–7.5)
VLDL: 27 MG/DL (ref 0–40)
WBC # BLD AUTO: 6.6 X10*3/UL (ref 4.4–11.3)

## 2024-07-13 PROCEDURE — 84443 ASSAY THYROID STIM HORMONE: CPT

## 2024-07-13 PROCEDURE — 84550 ASSAY OF BLOOD/URIC ACID: CPT

## 2024-07-13 PROCEDURE — 86803 HEPATITIS C AB TEST: CPT

## 2024-07-13 PROCEDURE — 80053 COMPREHEN METABOLIC PANEL: CPT

## 2024-07-13 PROCEDURE — 82570 ASSAY OF URINE CREATININE: CPT

## 2024-07-13 PROCEDURE — 82043 UR ALBUMIN QUANTITATIVE: CPT

## 2024-07-13 PROCEDURE — 83036 HEMOGLOBIN GLYCOSYLATED A1C: CPT

## 2024-07-13 PROCEDURE — 80061 LIPID PANEL: CPT

## 2024-07-13 PROCEDURE — 36415 COLL VENOUS BLD VENIPUNCTURE: CPT

## 2024-07-13 PROCEDURE — 85027 COMPLETE CBC AUTOMATED: CPT

## 2024-07-14 LAB
CREAT UR-MCNC: 62.7 MG/DL (ref 20–370)
MICROALBUMIN UR-MCNC: <7 MG/L
MICROALBUMIN/CREAT UR: NORMAL MG/G{CREAT}

## 2024-08-01 DIAGNOSIS — E11.9 TYPE 2 DIABETES MELLITUS WITHOUT COMPLICATIONS (MULTI): ICD-10-CM

## 2024-08-01 RX ORDER — METFORMIN HYDROCHLORIDE 500 MG/1
1000 TABLET, EXTENDED RELEASE ORAL 2 TIMES DAILY
Qty: 360 TABLET | Refills: 1 | Status: SHIPPED | OUTPATIENT
Start: 2024-08-01

## 2025-01-23 DIAGNOSIS — E11.9 TYPE 2 DIABETES MELLITUS WITHOUT COMPLICATIONS (MULTI): ICD-10-CM

## 2025-01-23 DIAGNOSIS — E79.0 HYPERURICEMIA: ICD-10-CM

## 2025-01-23 RX ORDER — ALLOPURINOL 100 MG/1
100 TABLET ORAL DAILY
Qty: 30 TABLET | Refills: 0 | Status: SHIPPED | OUTPATIENT
Start: 2025-01-23

## 2025-01-23 RX ORDER — METFORMIN HYDROCHLORIDE 500 MG/1
1000 TABLET, EXTENDED RELEASE ORAL 2 TIMES DAILY
Qty: 120 TABLET | Refills: 0 | Status: SHIPPED | OUTPATIENT
Start: 2025-01-23

## 2025-01-28 ENCOUNTER — HOSPITAL ENCOUNTER (OUTPATIENT)
Dept: RADIOLOGY | Facility: CLINIC | Age: 35
Discharge: HOME | End: 2025-01-28
Payer: COMMERCIAL

## 2025-01-28 ENCOUNTER — OFFICE VISIT (OUTPATIENT)
Dept: PRIMARY CARE | Facility: CLINIC | Age: 35
End: 2025-01-28
Payer: COMMERCIAL

## 2025-01-28 VITALS
HEIGHT: 70 IN | WEIGHT: 241 LBS | BODY MASS INDEX: 34.5 KG/M2 | SYSTOLIC BLOOD PRESSURE: 128 MMHG | HEART RATE: 66 BPM | DIASTOLIC BLOOD PRESSURE: 80 MMHG

## 2025-01-28 DIAGNOSIS — G89.29 CHRONIC RIGHT-SIDED LOW BACK PAIN WITHOUT SCIATICA: Primary | ICD-10-CM

## 2025-01-28 DIAGNOSIS — E11.9 TYPE 2 DIABETES MELLITUS WITHOUT COMPLICATION, WITHOUT LONG-TERM CURRENT USE OF INSULIN (MULTI): ICD-10-CM

## 2025-01-28 DIAGNOSIS — R10.9 FLANK PAIN: ICD-10-CM

## 2025-01-28 DIAGNOSIS — N20.0 LEFT RENAL STONE: ICD-10-CM

## 2025-01-28 DIAGNOSIS — M54.50 CHRONIC RIGHT-SIDED LOW BACK PAIN WITHOUT SCIATICA: Primary | ICD-10-CM

## 2025-01-28 LAB
POC APPEARANCE, URINE: CLEAR
POC BILIRUBIN, URINE: NEGATIVE
POC BLOOD, URINE: NEGATIVE
POC COLOR, URINE: YELLOW
POC GLUCOSE, URINE: NEGATIVE MG/DL
POC KETONES, URINE: NEGATIVE MG/DL
POC LEUKOCYTES, URINE: NEGATIVE
POC NITRITE,URINE: NEGATIVE
POC PH, URINE: 6 PH
POC PROTEIN, URINE: NEGATIVE MG/DL
POC SPECIFIC GRAVITY, URINE: 1.01
POC UROBILINOGEN, URINE: 0.2 EU/DL

## 2025-01-28 PROCEDURE — 74176 CT ABD & PELVIS W/O CONTRAST: CPT

## 2025-01-28 PROCEDURE — 81002 URINALYSIS NONAUTO W/O SCOPE: CPT | Performed by: FAMILY MEDICINE

## 2025-01-28 PROCEDURE — 99215 OFFICE O/P EST HI 40 MIN: CPT | Performed by: FAMILY MEDICINE

## 2025-01-28 PROCEDURE — 3074F SYST BP LT 130 MM HG: CPT | Performed by: FAMILY MEDICINE

## 2025-01-28 PROCEDURE — 1036F TOBACCO NON-USER: CPT | Performed by: FAMILY MEDICINE

## 2025-01-28 PROCEDURE — 74176 CT ABD & PELVIS W/O CONTRAST: CPT | Performed by: RADIOLOGY

## 2025-01-28 PROCEDURE — 3008F BODY MASS INDEX DOCD: CPT | Performed by: FAMILY MEDICINE

## 2025-01-28 PROCEDURE — 4010F ACE/ARB THERAPY RXD/TAKEN: CPT | Performed by: FAMILY MEDICINE

## 2025-01-28 PROCEDURE — 3079F DIAST BP 80-89 MM HG: CPT | Performed by: FAMILY MEDICINE

## 2025-01-28 RX ORDER — CYCLOBENZAPRINE HCL 10 MG
10 TABLET ORAL 2 TIMES DAILY PRN
Qty: 28 TABLET | Refills: 0 | Status: SHIPPED | OUTPATIENT
Start: 2025-01-28 | End: 2025-02-11

## 2025-01-28 ASSESSMENT — PATIENT HEALTH QUESTIONNAIRE - PHQ9
2. FEELING DOWN, DEPRESSED OR HOPELESS: NOT AT ALL
SUM OF ALL RESPONSES TO PHQ9 QUESTIONS 1 AND 2: 0
1. LITTLE INTEREST OR PLEASURE IN DOING THINGS: NOT AT ALL

## 2025-01-28 NOTE — ASSESSMENT & PLAN NOTE
Last hemoglobin A1c was elevated, your last blood work was done in July 2024, so please make follow-up with Dr. Cummins upon her return from maternity leave to address your ongoing medical concerns

## 2025-01-28 NOTE — PROGRESS NOTES
Subjective   Patient ID: Kyree Moore is a 34 y.o. male who presents for Back Pain.    Past Medical, Surgical, and Family History reviewed and updated in chart.    Reviewed all medications by prescribing practitioner or clinical pharmacist (such as prescriptions, OTCs, herbal therapies and supplements) and documented in the medical record.    CESILIA Adorno has been experiencing intermittent right lower back pain for the past month, which radiates to the right flank area. He notes that the pain does not intensify with palpation, twisting, or turning. He is concerned because the sensation feels more internal, similar to previous experiences with kidney stones. Kyree reports that his urine has appeared darker and cloudier than usual, increasing his concern about the possibility of a kidney stone. He has taken Tylenol, which provides only temporary relief.    His last CT scan, conducted in 2022, revealed multiple kidney stones, the largest measuring 6 mm. He denies having passed any stones during this current episode of back pain. It is important to note that his kidney stones have been composed of calcium oxalate, uric acid, and protein.    Review of Systems  All pertinent positive symptoms are included in the history of present illness.    All other systems have been reviewed and are negative and noncontributory to this patient's current ailments.    Past Medical History:   Diagnosis Date    Chronic kidney disease     Gout     Personal history of other diseases of the respiratory system     History of asthma    Personal history of urinary calculi     History of kidney stones     Past Surgical History:   Procedure Laterality Date    OTHER SURGICAL HISTORY  10/08/2020    No history of surgery    WISDOM TOOTH EXTRACTION       Social History     Tobacco Use    Smoking status: Never    Smokeless tobacco: Never     Family History   Problem Relation Name Age of Onset    Aortic aneurysm Mother      Hypertension Mother      Other  "(bladder cancer) Father      Kidney failure Father      Hypertension Father      Prostate cancer Paternal Grandfather       Immunization History   Administered Date(s) Administered    DTP 1991, 1991, 1991, 09/08/1992    DTaP, Unspecified 08/14/1996    HiB, unspecified 1991, 1991, 1991    MMR vaccine, subcutaneous (MMR II) 06/26/1992    OPV 1991, 1991, 1991, 09/08/1992, 08/14/1996     Current Outpatient Medications   Medication Instructions    allopurinol (ZYLOPRIM) 100 mg, oral, Daily    cyclobenzaprine (FLEXERIL) 10 mg, oral, 2 times daily PRN    lisinopril 40 mg, oral, Daily    metFORMIN XR (GLUCOPHAGE-XR) 1,000 mg, oral, 2 times daily     No Known Allergies    Objective   Vitals:    01/28/25 1454   BP: 128/80   Pulse: 66   Weight: 109 kg (241 lb)   Height: 1.778 m (5' 10\")     Body mass index is 34.58 kg/m².    BP Readings from Last 3 Encounters:   01/28/25 128/80   06/27/24 130/84   10/16/23 122/82      Wt Readings from Last 3 Encounters:   01/28/25 109 kg (241 lb)   06/27/24 99.3 kg (219 lb)   10/16/23 108 kg (238 lb)        Office Visit on 01/28/2025   Component Date Value    POC Color, Urine 01/28/2025 Yellow     POC Appearance, Urine 01/28/2025 Clear     POC Glucose, Urine 01/28/2025 NEGATIVE     POC Bilirubin, Urine 01/28/2025 NEGATIVE     POC Ketones, Urine 01/28/2025 NEGATIVE     POC Specific Gravity, Ur* 01/28/2025 1.015     POC Blood, Urine 01/28/2025 NEGATIVE     POC PH, Urine 01/28/2025 6.0     POC Protein, Urine 01/28/2025 NEGATIVE     POC Urobilinogen, Urine 01/28/2025 0.2     Poc Nitrite, Urine 01/28/2025 NEGATIVE     POC Leukocytes, Urine 01/28/2025 NEGATIVE      Interpreted By:  Lavelle Jason,   STUDY:  CT ABDOMEN PELVIS WO IV CONTRAST; 1/28/2025 4:23 pm      INDICATION:  Signs/Symptoms:back and flank pain with hx of stones;      COMPARISON:  02/16/2015      ORDERING CLINICIAN:  SOHEILA DILLARD      TECHNIQUE:  Contiguous axial images from " the lung bases through the abdomen and  pelvis were performed. Additionally, coronal and sagittal  reconstructed images were obtained. All CT examinations are performed  with 1 or more of the following dose reduction techniques: Automated  exposure control, adjustment of mA and/or kv according to patient's  size, or use of iterative reconstruction techniques.      FINDINGS:  Evaluation of the solid viscera is suboptimal due to the lack of  intravenous contrast.      Limited images of the lower thorax: No pleural or pericardial  effusion.      The liver is diffusely hypodense consistent with hepatic steatosis.  The liver is borderline mildly enlarged measuring approximately 18.5  cm in length. No focal hepatic lesions.      The gallbladder, common bile duct, pancreas, spleen, and adrenal  glands are unremarkable.      Kidneys: There is a 3 mm nonobstructive calculus in the mid-upper  left kidney and a punctate calculus in the mid left kidney. No right  renal calculi are seen. No hydronephrosis bilaterally.      Urinary bladder: Normally distended. No wall thickening is seen. No  calculi.      The visualized aorta is unremarkable.      The bowel is nondistended without evidence for acute pathology. The  appendix is within normal limits.      No ascites or free intraperitoneal air within the abdomen or pelvis  is seen.      The visualized osseous structures are intact.       IMPRESSION:  No CT evidence for acute pathology within the abdomen or pelvis.      There are a couple nonobstructive calculi within the left kidney, the  largest measuring 3 mm.      Hepatic steatosis. Borderline mild hepatomegaly.      Signed by: Lavelle Jason 1/28/2025 6:37 PM  Dictation workstation:   [id number]    Physical Exam  CONSTITUTIONAL - well nourished, well developed, looks like stated age, in no acute distress, not ill-appearing, and not tired appearing  SKIN - normal skin color and pigmentation, normal skin turgor without rash,  lesions, or nodules visualized  HEAD - no trauma, normocephalic  NECK - supple without rigidity, no neck mass was observed, no thyromegaly or thyroid nodules  CHEST - clear to auscultation, no wheezing, no crackles and no rales, good effort  CARDIAC - regular rate and regular rhythm, no skipped beats, no murmur  ABDOMEN - no organomegaly, soft, nontender, nondistended, normal bowel sounds, no guarding/rebound/rigidity, negative McBurney sign and negative Iglesias sign  EXTREMITIES - unable to reproduce any tenderness in the paraspinal muscles, spinous process area, rib area, or anywhere in the back, negative CVA tenderness  NEUROLOGICAL - normal gait, normal balance, normal motor, no ataxia,   PSYCHIATRIC - alert, pleasant and cordial, age-appropriate    Assessment/Plan   Problem List Items Addressed This Visit       Type 2 diabetes mellitus     Last hemoglobin A1c was elevated, your last blood work was done in July 2024, so please make follow-up with Dr. Cummins upon her return from maternity leave to address your ongoing medical concerns         Lower back pain - Primary     Urinalysis in the office was negative for any hematuria or any signs of infection         Relevant Orders    POCT UA (nonautomated) manually resulted (Completed)    CT abdomen pelvis wo IV contrast (Completed)    Flank pain     Based on the findings from the CT and the urinalysis, I suspect that this is a musculoskeletal problem.  I would recommend a muscle relaxant to see if we can calm some of the inflammation.  Initially I thought about using a steroid pack, but because of your diabetes, that is not a good idea because it can cause a blood sugar to go up even higher than it has been.    I would recommend getting back in with Dr. Cummins upon her return from maternity leave so that you can address your ongoing medical concerns.         Relevant Medications    cyclobenzaprine (Flexeril) 10 mg tablet    Other Relevant Orders    CT abdomen pelvis wo  IV contrast (Completed)    Left renal stone     Stat CT renal stone protocol indicates a couple of nonobstructive calculi within the left kidney, largest measuring 3 mm, but there appears to be no other acute pathology

## 2025-01-29 NOTE — ASSESSMENT & PLAN NOTE
Stat CT renal stone protocol indicates a couple of nonobstructive calculi within the left kidney, largest measuring 3 mm, but there appears to be no other acute pathology

## 2025-01-29 NOTE — RESULT ENCOUNTER NOTE
The stat CT renal stone protocol reveals a couple of nonobstructive calculi in the left kidney, with the largest measuring 3 mm, and no other acute pathology was identified. The urinalysis conducted in the office was negative for hematuria or any signs of infection.    Based on the findings from the CT scan and urinalysis, it appears that the issue may be musculoskeletal in nature. I recommend trying a muscle relaxant to help alleviate some of the inflammation. Initially, I considered prescribing a steroid pack; however, due to your diabetes, this is not advisable as it could significantly elevate your blood sugar levels.    I also suggest scheduling an appointment with Dr. Cummins once she returns from maternity leave to address your ongoing medical concerns comprehensively.

## 2025-01-29 NOTE — ASSESSMENT & PLAN NOTE
Based on the findings from the CT and the urinalysis, I suspect that this is a musculoskeletal problem.  I would recommend a muscle relaxant to see if we can calm some of the inflammation.  Initially I thought about using a steroid pack, but because of your diabetes, that is not a good idea because it can cause a blood sugar to go up even higher than it has been.    I would recommend getting back in with Dr. Cummins upon her return from maternity leave so that you can address your ongoing medical concerns.

## 2025-02-20 DIAGNOSIS — E11.9 TYPE 2 DIABETES MELLITUS WITHOUT COMPLICATIONS (MULTI): ICD-10-CM

## 2025-02-20 DIAGNOSIS — E79.0 HYPERURICEMIA: ICD-10-CM

## 2025-02-20 RX ORDER — METFORMIN HYDROCHLORIDE 500 MG/1
1000 TABLET, EXTENDED RELEASE ORAL 2 TIMES DAILY
Qty: 360 TABLET | Refills: 1 | OUTPATIENT
Start: 2025-02-20

## 2025-02-21 DIAGNOSIS — E79.0 HYPERURICEMIA: Primary | ICD-10-CM

## 2025-02-21 RX ORDER — ALLOPURINOL 100 MG/1
100 TABLET ORAL DAILY
Qty: 90 TABLET | Refills: 1 | OUTPATIENT
Start: 2025-02-21

## 2025-02-23 DIAGNOSIS — E79.0 HYPERURICEMIA: ICD-10-CM

## 2025-02-23 DIAGNOSIS — E11.9 TYPE 2 DIABETES MELLITUS WITHOUT COMPLICATIONS (MULTI): ICD-10-CM

## 2025-02-24 RX ORDER — METFORMIN HYDROCHLORIDE 500 MG/1
1000 TABLET, EXTENDED RELEASE ORAL 2 TIMES DAILY
Qty: 120 TABLET | Refills: 0 | OUTPATIENT
Start: 2025-02-24

## 2025-02-24 RX ORDER — ALLOPURINOL 100 MG/1
100 TABLET ORAL DAILY
Qty: 30 TABLET | Refills: 0 | OUTPATIENT
Start: 2025-02-24

## 2025-03-11 DIAGNOSIS — I10 BENIGN ESSENTIAL HYPERTENSION: ICD-10-CM

## 2025-03-12 RX ORDER — LISINOPRIL 40 MG/1
40 TABLET ORAL DAILY
Qty: 90 TABLET | Refills: 1 | OUTPATIENT
Start: 2025-03-12

## 2025-03-31 ENCOUNTER — PATIENT MESSAGE (OUTPATIENT)
Dept: PRIMARY CARE | Facility: CLINIC | Age: 35
End: 2025-03-31
Payer: COMMERCIAL

## 2025-03-31 DIAGNOSIS — E79.0 HYPERURICEMIA: ICD-10-CM

## 2025-03-31 DIAGNOSIS — Z13.6 SCREENING FOR CARDIOVASCULAR CONDITION: ICD-10-CM

## 2025-03-31 DIAGNOSIS — Z00.00 ENCOUNTER FOR PREVENTIVE HEALTH EXAMINATION: Primary | ICD-10-CM

## 2025-03-31 DIAGNOSIS — Z13.9 SCREENING FOR CONDITION: ICD-10-CM

## 2025-03-31 DIAGNOSIS — Z13.1 SCREENING FOR DIABETES MELLITUS: ICD-10-CM

## 2025-03-31 DIAGNOSIS — Z13.29 SCREENING FOR ENDOCRINE DISORDER: ICD-10-CM

## 2025-04-05 LAB
ALBUMIN SERPL-MCNC: 4.8 G/DL (ref 3.6–5.1)
ALP SERPL-CCNC: 70 U/L (ref 36–130)
ALT SERPL-CCNC: 43 U/L (ref 9–46)
ANION GAP SERPL CALCULATED.4IONS-SCNC: 10 MMOL/L (CALC) (ref 7–17)
AST SERPL-CCNC: 24 U/L (ref 10–40)
BILIRUB SERPL-MCNC: 0.7 MG/DL (ref 0.2–1.2)
BUN SERPL-MCNC: 12 MG/DL (ref 7–25)
CALCIUM SERPL-MCNC: 9.7 MG/DL (ref 8.6–10.3)
CHLORIDE SERPL-SCNC: 102 MMOL/L (ref 98–110)
CHOLEST SERPL-MCNC: 198 MG/DL
CHOLEST/HDLC SERPL: 4.3 (CALC)
CO2 SERPL-SCNC: 26 MMOL/L (ref 20–32)
CREAT SERPL-MCNC: 0.9 MG/DL (ref 0.6–1.26)
EGFRCR SERPLBLD CKD-EPI 2021: 115 ML/MIN/1.73M2
ERYTHROCYTE [DISTWIDTH] IN BLOOD BY AUTOMATED COUNT: 13 % (ref 11–15)
EST. AVERAGE GLUCOSE BLD GHB EST-MCNC: 174 MG/DL
EST. AVERAGE GLUCOSE BLD GHB EST-SCNC: 9.7 MMOL/L
GLUCOSE SERPL-MCNC: 114 MG/DL (ref 65–99)
HBA1C MFR BLD: 7.7 % OF TOTAL HGB
HCT VFR BLD AUTO: 46.8 % (ref 38.5–50)
HDLC SERPL-MCNC: 46 MG/DL
HGB BLD-MCNC: 15.2 G/DL (ref 13.2–17.1)
LDLC SERPL CALC-MCNC: 123 MG/DL (CALC)
MCH RBC QN AUTO: 27.7 PG (ref 27–33)
MCHC RBC AUTO-ENTMCNC: 32.5 G/DL (ref 32–36)
MCV RBC AUTO: 85.4 FL (ref 80–100)
NONHDLC SERPL-MCNC: 152 MG/DL (CALC)
PLATELET # BLD AUTO: 333 THOUSAND/UL (ref 140–400)
PMV BLD REES-ECKER: 9.7 FL (ref 7.5–12.5)
POTASSIUM SERPL-SCNC: 5.6 MMOL/L (ref 3.5–5.3)
PROT SERPL-MCNC: 8.2 G/DL (ref 6.1–8.1)
RBC # BLD AUTO: 5.48 MILLION/UL (ref 4.2–5.8)
SODIUM SERPL-SCNC: 138 MMOL/L (ref 135–146)
TRIGL SERPL-MCNC: 177 MG/DL
TSH SERPL-ACNC: 2.19 MIU/L (ref 0.4–4.5)
URATE SERPL-MCNC: 7.3 MG/DL (ref 4–8)
WBC # BLD AUTO: 7.8 THOUSAND/UL (ref 3.8–10.8)

## 2025-04-07 ENCOUNTER — PATIENT MESSAGE (OUTPATIENT)
Dept: PRIMARY CARE | Facility: CLINIC | Age: 35
End: 2025-04-07
Payer: COMMERCIAL

## 2025-04-07 DIAGNOSIS — E87.5 HYPERKALEMIA: Primary | ICD-10-CM

## 2025-04-07 DIAGNOSIS — E11.9 TYPE 2 DIABETES MELLITUS WITHOUT COMPLICATION, WITHOUT LONG-TERM CURRENT USE OF INSULIN: Primary | ICD-10-CM

## 2025-04-13 ENCOUNTER — PATIENT MESSAGE (OUTPATIENT)
Dept: PRIMARY CARE | Facility: CLINIC | Age: 35
End: 2025-04-13
Payer: COMMERCIAL

## 2025-04-13 DIAGNOSIS — I10 BENIGN ESSENTIAL HYPERTENSION: ICD-10-CM

## 2025-04-14 RX ORDER — LISINOPRIL 40 MG/1
40 TABLET ORAL DAILY
Qty: 30 TABLET | Refills: 0 | Status: SHIPPED | OUTPATIENT
Start: 2025-04-14 | End: 2025-05-14

## 2025-05-06 DIAGNOSIS — I10 BENIGN ESSENTIAL HYPERTENSION: ICD-10-CM

## 2025-05-06 RX ORDER — LISINOPRIL 40 MG/1
40 TABLET ORAL DAILY
Qty: 90 TABLET | Refills: 1 | OUTPATIENT
Start: 2025-05-06

## 2025-05-08 ENCOUNTER — APPOINTMENT (OUTPATIENT)
Dept: PRIMARY CARE | Facility: CLINIC | Age: 35
End: 2025-05-08
Payer: COMMERCIAL

## 2025-05-12 ENCOUNTER — APPOINTMENT (OUTPATIENT)
Dept: PRIMARY CARE | Facility: CLINIC | Age: 35
End: 2025-05-12
Payer: COMMERCIAL

## 2025-05-12 ENCOUNTER — PATIENT MESSAGE (OUTPATIENT)
Dept: PRIMARY CARE | Facility: CLINIC | Age: 35
End: 2025-05-12

## 2025-05-12 VITALS — SYSTOLIC BLOOD PRESSURE: 130 MMHG | HEART RATE: 91 BPM | DIASTOLIC BLOOD PRESSURE: 80 MMHG | OXYGEN SATURATION: 97 %

## 2025-05-12 DIAGNOSIS — E79.0 HYPERURICEMIA: ICD-10-CM

## 2025-05-12 DIAGNOSIS — E11.9 TYPE 2 DIABETES MELLITUS WITHOUT COMPLICATION, WITHOUT LONG-TERM CURRENT USE OF INSULIN: Primary | ICD-10-CM

## 2025-05-12 DIAGNOSIS — I10 BENIGN ESSENTIAL HYPERTENSION: ICD-10-CM

## 2025-05-12 PROCEDURE — 1036F TOBACCO NON-USER: CPT | Performed by: STUDENT IN AN ORGANIZED HEALTH CARE EDUCATION/TRAINING PROGRAM

## 2025-05-12 PROCEDURE — 3079F DIAST BP 80-89 MM HG: CPT | Performed by: STUDENT IN AN ORGANIZED HEALTH CARE EDUCATION/TRAINING PROGRAM

## 2025-05-12 PROCEDURE — 99214 OFFICE O/P EST MOD 30 MIN: CPT | Performed by: STUDENT IN AN ORGANIZED HEALTH CARE EDUCATION/TRAINING PROGRAM

## 2025-05-12 PROCEDURE — 3075F SYST BP GE 130 - 139MM HG: CPT | Performed by: STUDENT IN AN ORGANIZED HEALTH CARE EDUCATION/TRAINING PROGRAM

## 2025-05-12 PROCEDURE — 4010F ACE/ARB THERAPY RXD/TAKEN: CPT | Performed by: STUDENT IN AN ORGANIZED HEALTH CARE EDUCATION/TRAINING PROGRAM

## 2025-05-12 RX ORDER — ALLOPURINOL 200 MG/1
200 TABLET ORAL DAILY
Qty: 90 TABLET | Refills: 1 | Status: SHIPPED | OUTPATIENT
Start: 2025-05-12 | End: 2025-11-08

## 2025-05-12 RX ORDER — METFORMIN HYDROCHLORIDE 500 MG/1
1000 TABLET, EXTENDED RELEASE ORAL 2 TIMES DAILY
Qty: 120 TABLET | Refills: 0 | Status: CANCELLED | OUTPATIENT
Start: 2025-05-12

## 2025-05-12 RX ORDER — ALLOPURINOL 100 MG/1
100 TABLET ORAL DAILY
Qty: 30 TABLET | Refills: 0 | Status: CANCELLED | OUTPATIENT
Start: 2025-05-12

## 2025-05-12 RX ORDER — LISINOPRIL 40 MG/1
40 TABLET ORAL DAILY
Qty: 90 TABLET | Refills: 1 | Status: SHIPPED | OUTPATIENT
Start: 2025-05-12 | End: 2025-11-08

## 2025-05-12 NOTE — PROGRESS NOTES
Kyree Moore is a 35 y.o. year old male presenting for Diabetes       HPI:  Type 2 diabetes  His most recent hemoglobin A1c is uncontrolled, so Jardiance 25 mg daily was added to his metformin.  He was a little confused, thinking that the Jardiance will replace the metformin, so he has not been taking the metformin over the last month.  He was tolerating it at 2 tablets a day, but he is willing to try to increase this and max it out to 4 tablets a day.  He has not noticed any side effects to the Jardiance since starting it.  He states that his diet was very poor over the last several months while taking care of his grandfather who did pass away a couple months ago.  He states that he is getting back into a more healthy lifestyle with prepared meals that come to his house and his goal is to start getting more physical activity.    Hypertension  He currently takes lisinopril 40 mg daily and he is doing well on this medication without side effects.  Denies any chest pain, shortness of breath, headaches, vision changes, weakness, numbness, tingling, lower extremity edema.    Hyperuricemia  He has a history of recurrent uric acid kidney stones and has been on allopurinol 100 mg daily.  His most recent uric acid level was greater than 7.  He did have recent kidney stones and he did pass 1 about 1 to 1-1/2 months ago.  He reports no side effects to the allopurinol.    ROS:   All pertinent positive symptoms are included in history of present illness.    All other systems have been reviewed and are negative and noncontributory to this patient's current ailments.    Current Medications[1]    OBJECTIVE  Visit Vitals  /80 (BP Location: Left arm, Patient Position: Sitting, BP Cuff Size: Adult)   Pulse 91   SpO2 97%   Smoking Status Never        Physical Exam:  GENERAL: Alert, oriented, pleasant, in no acute distress  HEENT: Head normocephalic, atraumatic  CV: Heart with regular rate and rhythm, normal S1/S2, no murmurs;  normal peripheral pulses- radial and dorsalis pedis palpable  LUNGS: CTAB without wheezing, rhonchi or rales; good respiratory effort, no increased work of breathing  EXTREMITIES: no edema, no cyanosis  PSYCH: Appropriate mood and affect  SKIN: No rashes or lesions appreciated    Recent Results (from the past 6 weeks)   CBC    Collection Time: 04/04/25  7:44 AM   Result Value Ref Range    WHITE BLOOD CELL COUNT 7.8 3.8 - 10.8 Thousand/uL    RED BLOOD CELL COUNT 5.48 4.20 - 5.80 Million/uL    HEMOGLOBIN 15.2 13.2 - 17.1 g/dL    HEMATOCRIT 46.8 38.5 - 50.0 %    MCV 85.4 80.0 - 100.0 fL    MCH 27.7 27.0 - 33.0 pg    MCHC 32.5 32.0 - 36.0 g/dL    RDW 13.0 11.0 - 15.0 %    PLATELET COUNT 333 140 - 400 Thousand/uL    MPV 9.7 7.5 - 12.5 fL   Comprehensive Metabolic Panel    Collection Time: 04/04/25  7:44 AM   Result Value Ref Range    GLUCOSE 114 (H) 65 - 99 mg/dL    UREA NITROGEN (BUN) 12 7 - 25 mg/dL    CREATININE 0.90 0.60 - 1.26 mg/dL    EGFR 115 > OR = 60 mL/min/1.73m2    SODIUM 138 135 - 146 mmol/L    POTASSIUM 5.6 (H) 3.5 - 5.3 mmol/L    CHLORIDE 102 98 - 110 mmol/L    CARBON DIOXIDE 26 20 - 32 mmol/L    ELECTROLYTE BALANCE 10 7 - 17 mmol/L (calc)    CALCIUM 9.7 8.6 - 10.3 mg/dL    PROTEIN, TOTAL 8.2 (H) 6.1 - 8.1 g/dL    ALBUMIN 4.8 3.6 - 5.1 g/dL    BILIRUBIN, TOTAL 0.7 0.2 - 1.2 mg/dL    ALKALINE PHOSPHATASE 70 36 - 130 U/L    AST 24 10 - 40 U/L    ALT 43 9 - 46 U/L   Hemoglobin A1C    Collection Time: 04/04/25  7:44 AM   Result Value Ref Range    HEMOGLOBIN A1c 7.7 (H) <5.7 % of total Hgb    eAG (mg/dL) 174 mg/dL    eAG (mmol/L) 9.7 mmol/L   Lipid Panel    Collection Time: 04/04/25  7:44 AM   Result Value Ref Range    CHOLESTEROL, TOTAL 198 <200 mg/dL    HDL CHOLESTEROL 46 > OR = 40 mg/dL    TRIGLYCERIDES 177 (H) <150 mg/dL    LDL-CHOLESTEROL 123 (H) mg/dL (calc)    CHOL/HDLC RATIO 4.3 <5.0 (calc)    NON HDL CHOLESTEROL 152 (H) <130 mg/dL (calc)   TSH with reflex to Free T4 if abnormal    Collection Time:  04/04/25  7:44 AM   Result Value Ref Range    TSH W/REFLEX TO FT4 2.19 0.40 - 4.50 mIU/L   Uric acid    Collection Time: 04/04/25  7:44 AM   Result Value Ref Range    URIC ACID 7.3 4.0 - 8.0 mg/dL       Assessment/Plan   Diagnoses and all orders for this visit:  Type 2 diabetes mellitus without complication, without long-term current use of insulin  Uncontrolled with most recent A1c at 7.7% on metformin 1000 mg daily.  At the time of his results, Jardiance 25 mg was added, and I instructed him to add back the metformin and to try to max it out to 4 tablets a day.  Continue working on lifestyle changes with cleaning up the diet and increasing physical activity.  Will recheck A1c in 3 months  Benign essential hypertension  Controlled on current medication, refills provided  -     lisinopril 40 mg tablet; Take 1 tablet (40 mg) by mouth once daily.  Hyperuricemia  Uric acid was above goal at 7.3 on 100 mg of allopurinol daily.  Increase allopurinol to 200 mg daily for optimal prevention of uric acid stones.  -     allopurinol 200 mg tablet; Take 200 mg by mouth once daily.      Return in 3 months with labs before            [1]   Current Outpatient Medications   Medication Sig Dispense Refill    allopurinol (Zyloprim) 100 mg tablet TAKE 1 TABLET BY MOUTH EVERY DAY 30 tablet 0    empagliflozin (Jardiance) 25 mg tablet Take 1 tablet (25 mg) by mouth once daily. 90 tablet 0    lisinopril 40 mg tablet Take 1 tablet (40 mg) by mouth once daily. 30 tablet 0    metFORMIN  mg 24 hr tablet TAKE 2 TABLETS (1000 MG) BY MOUTH TWICE A  tablet 0     No current facility-administered medications for this visit.

## 2025-05-16 RX ORDER — ALLOPURINOL 100 MG/1
100 TABLET ORAL 2 TIMES DAILY
COMMUNITY

## 2025-09-05 ENCOUNTER — APPOINTMENT (OUTPATIENT)
Dept: PRIMARY CARE | Facility: CLINIC | Age: 35
End: 2025-09-05
Payer: COMMERCIAL

## 2025-09-05 PROBLEM — K76.0 NAFLD (NONALCOHOLIC FATTY LIVER DISEASE): Status: ACTIVE | Noted: 2025-09-05

## 2025-09-05 PROBLEM — Z00.00 ANNUAL PHYSICAL EXAM: Status: ACTIVE | Noted: 2025-09-05

## 2025-09-05 PROBLEM — Z87.442 HX OF RENAL CALCULI: Status: ACTIVE | Noted: 2025-09-05

## 2025-09-05 PROBLEM — R19.4 ENCOUNTER FOR DIAGNOSTIC COLONOSCOPY DUE TO CHANGE IN BOWEL HABITS: Status: ACTIVE | Noted: 2025-09-05

## 2025-09-06 LAB
ALBUMIN SERPL-MCNC: 4.6 G/DL (ref 3.6–5.1)
ALBUMIN/CREAT UR: NORMAL
ALP SERPL-CCNC: 68 U/L (ref 36–130)
ALT SERPL-CCNC: 34 U/L (ref 9–46)
ANION GAP SERPL CALCULATED.4IONS-SCNC: 11 MMOL/L (CALC) (ref 7–17)
AST SERPL-CCNC: 24 U/L (ref 10–40)
BILIRUB SERPL-MCNC: 0.7 MG/DL (ref 0.2–1.2)
BUN SERPL-MCNC: 13 MG/DL (ref 7–25)
CALCIUM SERPL-MCNC: 10 MG/DL (ref 8.6–10.3)
CHLORIDE SERPL-SCNC: 100 MMOL/L (ref 98–110)
CHOLEST SERPL-MCNC: 193 MG/DL
CHOLEST/HDLC SERPL: 4.8 (CALC)
CO2 SERPL-SCNC: 27 MMOL/L (ref 20–32)
CREAT SERPL-MCNC: 0.8 MG/DL (ref 0.6–1.26)
CREAT UR-MCNC: NORMAL MG/DL
EGFRCR SERPLBLD CKD-EPI 2021: 119 ML/MIN/1.73M2
EST. AVERAGE GLUCOSE BLD GHB EST-MCNC: 183 MG/DL
EST. AVERAGE GLUCOSE BLD GHB EST-SCNC: 10.1 MMOL/L
GLUCOSE SERPL-MCNC: 151 MG/DL (ref 65–99)
HBA1C MFR BLD: 8 %
HDLC SERPL-MCNC: 40 MG/DL
LDLC SERPL CALC-MCNC: 126 MG/DL (CALC)
MICROALBUMIN UR-MCNC: NORMAL
NONHDLC SERPL-MCNC: 153 MG/DL (CALC)
POTASSIUM SERPL-SCNC: 4.6 MMOL/L (ref 3.5–5.3)
POTASSIUM SERPL-SCNC: 5.4 MMOL/L (ref 3.5–5.3)
PROT SERPL-MCNC: 7.9 G/DL (ref 6.1–8.1)
SODIUM SERPL-SCNC: 138 MMOL/L (ref 135–146)
TRIGL SERPL-MCNC: 157 MG/DL
URATE SERPL-MCNC: 6.9 MG/DL (ref 4–8)